# Patient Record
Sex: FEMALE | Race: WHITE | Employment: FULL TIME | URBAN - METROPOLITAN AREA
[De-identification: names, ages, dates, MRNs, and addresses within clinical notes are randomized per-mention and may not be internally consistent; named-entity substitution may affect disease eponyms.]

---

## 2021-08-18 ENCOUNTER — OFFICE VISIT (OUTPATIENT)
Dept: URGENT CARE | Age: 56
End: 2021-08-18
Payer: COMMERCIAL

## 2021-08-18 VITALS — TEMPERATURE: 99.4 F | HEART RATE: 95 BPM | RESPIRATION RATE: 17 BRPM | OXYGEN SATURATION: 96 %

## 2021-08-18 DIAGNOSIS — Z20.822 EXPOSURE TO COVID-19 VIRUS: Primary | ICD-10-CM

## 2021-08-18 LAB — SARS-COV-2 POC: NEGATIVE

## 2021-08-18 PROCEDURE — 87426 SARSCOV CORONAVIRUS AG IA: CPT | Performed by: FAMILY MEDICINE

## 2021-08-18 PROCEDURE — 99202 OFFICE O/P NEW SF 15 MIN: CPT | Performed by: FAMILY MEDICINE

## 2021-08-18 NOTE — PROGRESS NOTES
This patient was seen at [address] [place] [address] Urgent Care while in their vehicle due to COVID-19 pandemic with PPE and focused examination in order to decrease community viral transmission. The patient/guardian gave verbal consent to treat. Direct eposure to Covid  She is vaccinated    The history is provided by the patient. Fatigue  This is a new problem. The current episode started more than 2 days ago. The problem occurs daily. The problem has not changed since onset. Associated symptoms include abdominal pain and headaches. Pertinent negatives include no shortness of breath. Nothing relieves the symptoms. She has tried nothing for the symptoms. Past Medical History:   Diagnosis Date    Adverse effect of anesthesia     startles awake out of anesthesia    Borderline diabetes     Cancer ([facility] [address] 2008    uterine    Chronic pain     right knee pain    Depression     GERD (gastroesophageal reflux disease)     Hypertension     Neuropathy     Right leg    Unspecified sleep apnea     cpap    Urgency of urination         Past Surgical History:   Procedure Laterality Date    HX APPENDECTOMY      HX HYSTERECTOMY      HX ORTHOPAEDIC Right 05/2015    knee arthroscopy         History reviewed. No pertinent family history.      Social History     Socioeconomic History    Marital status:      Spouse name: Not on file    Number of children: Not on file    Years of education: Not on file    Highest education level: Not on file   Occupational History    Not on file   Tobacco Use    Smoking status: Never Smoker    Smokeless tobacco: Never Used   Substance and Sexual Activity    Alcohol use: Yes     Comment: socially    Drug use: No    Sexual activity: Not on file   Other Topics Concern    Not on file   Social History Narrative    Not on file     Social Determinants of Health     Financial Resource Strain:     Difficulty of Paying Living Expenses:    Food Insecurity:     Worried About Running Out of Food in the Last Year:    [address] in the Last Year:    Transportation Needs:     Lack of Transportation (Medical):  Lack of Transportation (Non-Medical):    Physical Activity:     Days of Exercise per Week:     Minutes of Exercise per Session:    Stress:     Feeling of Stress :    Social Connections:     Frequency of Communication with Friends and Family:     Frequency of Social Gatherings with Friends and Family:     Attends Nondenominational Services:     Active Member of Clubs or Organizations:     Attends Club or Organization Meetings:     Marital Status:    Intimate Partner Violence:     Fear of Current or Ex-Partner:     Emotionally Abused:     Physically Abused:     Sexually Abused: ALLERGIES: Patient has no known allergies. Review of Systems   Constitutional: Positive for fatigue. Respiratory: Negative for shortness of breath. Gastrointestinal: Positive for abdominal pain. Neurological: Positive for headaches. All other systems reviewed and are negative. Vitals:    08/18/21 1721   Pulse: 95   Resp: 17   Temp: 99.4 °F (37.4 °C)   SpO2: 96%       Physical Exam  Vitals and nursing note reviewed. Constitutional:       General: She is not in acute distress. Appearance: She is not ill-appearing. Pulmonary:      Effort: Pulmonary effort is normal. No respiratory distress. Breath sounds: No wheezing. MDM    Procedures        ICD-10-CM ICD-9-CM    1. Exposure to COVID-19 virus  Z20.822 V01.79 NOVEL CORONAVIRUS (COVID-19)      AMB POC SARS-COV-2     No orders of the defined types were placed in this encounter. No results found for any visits on 08/18/21. The patients condition was discussed with the patient and they understand. The patient is to follow up with primary care doctor. If signs and symptoms become worse the pt is to go to the ER. The patient is to take medications as prescribed.

## 2021-08-20 LAB
SARS-COV-2, NAA 2 DAY TAT: NORMAL
SARS-COV-2, NAA: NOT DETECTED

## 2021-09-07 ENCOUNTER — OFFICE VISIT (OUTPATIENT)
Dept: URGENT CARE | Age: 56
End: 2021-09-07
Payer: COMMERCIAL

## 2021-09-07 VITALS — HEART RATE: 93 BPM | OXYGEN SATURATION: 96 % | TEMPERATURE: 98.8 F | RESPIRATION RATE: 18 BRPM

## 2021-09-07 DIAGNOSIS — Z20.822 ENCOUNTER FOR LABORATORY TESTING FOR COVID-19 VIRUS: Primary | ICD-10-CM

## 2021-09-07 LAB — SARS-COV-2 POC: NEGATIVE

## 2021-09-07 PROCEDURE — 99213 OFFICE O/P EST LOW 20 MIN: CPT | Performed by: FAMILY MEDICINE

## 2021-09-07 PROCEDURE — 87426 SARSCOV CORONAVIRUS AG IA: CPT | Performed by: FAMILY MEDICINE

## 2021-09-07 RX ORDER — ONDANSETRON 4 MG/1
4 TABLET, ORALLY DISINTEGRATING ORAL
Qty: 20 TABLET | Refills: 0 | Status: SHIPPED | OUTPATIENT
Start: 2021-09-07 | End: 2021-09-07 | Stop reason: SDUPTHER

## 2021-09-07 RX ORDER — ONDANSETRON 4 MG/1
4 TABLET, ORALLY DISINTEGRATING ORAL
Qty: 20 TABLET | Refills: 0 | Status: SHIPPED | OUTPATIENT
Start: 2021-09-07 | End: 2022-03-22

## 2021-09-07 RX ORDER — ONDANSETRON 4 MG/1
4 TABLET, ORALLY DISINTEGRATING ORAL
Qty: 1 TABLET | Refills: 0 | Status: SHIPPED | COMMUNITY
Start: 2021-09-07 | End: 2021-09-07

## 2021-09-07 NOTE — PROGRESS NOTES
Patient presents with a request for COVID Testing. She was exposed to a COVID positive person. She is having fevers and chills, nausea and vomiting, malaise and fatigue. She also has sinus symptoms and headache. Patient has not tried anything for her symptoms as she thought it was related to her blood sugar. Her  is undergoing chemo and she is concerned. This patient was seen in [facility] at [address] [place] [address] Urgent Care while in their vehicle due to COVID-19 pandemic with PPE and focused examination in order to decrease community viral transmission. The patient/guardian gave verbal consent to treat. Past Medical History:   Diagnosis Date    Adverse effect of anesthesia     startles awake out of anesthesia    Borderline diabetes     Cancer ([facility] [address] 2008    uterine    Chronic pain     right knee pain    Depression     GERD (gastroesophageal reflux disease)     Hypertension     Neuropathy     Right leg    Unspecified sleep apnea     cpap    Urgency of urination         Past Surgical History:   Procedure Laterality Date    HX APPENDECTOMY      HX HYSTERECTOMY      HX ORTHOPAEDIC Right 05/2015    knee arthroscopy         History reviewed. No pertinent family history.      Social History     Socioeconomic History    Marital status:      Spouse name: Not on file    Number of children: Not on file    Years of education: Not on file    Highest education level: Not on file   Occupational History    Not on file   Tobacco Use    Smoking status: Never Smoker    Smokeless tobacco: Never Used   Substance and Sexual Activity    Alcohol use: Yes     Comment: socially    Drug use: No    Sexual activity: Not on file   Other Topics Concern    Not on file   Social History Narrative    Not on file     Social Determinants of Health     Financial Resource Strain:     Difficulty of Paying Living Expenses:    Food Insecurity:     Worried About Running Out of Food in the Last Year:     Ran Out of Food in the Last Year:    Transportation Needs:     Lack of Transportation (Medical):  Lack of Transportation (Non-Medical):    Physical Activity:     Days of Exercise per Week:     Minutes of Exercise per Session:    Stress:     Feeling of Stress :    Social Connections:     Frequency of Communication with Friends and Family:     Frequency of Social Gatherings with Friends and Family:     Attends Restorationism Services:     Active Member of Clubs or Organizations:     Attends Club or Organization Meetings:     Marital Status:    Intimate Partner Violence:     Fear of Current or Ex-Partner:     Emotionally Abused:     Physically Abused:     Sexually Abused: ALLERGIES: Patient has no known allergies. Review of Systems   Constitutional: Positive for activity change, appetite change, chills, fatigue and fever. HENT: Positive for congestion, rhinorrhea and sinus pressure. Negative for sinus pain. Respiratory: Negative for cough, chest tightness and shortness of breath. Cardiovascular: Negative for chest pain. Gastrointestinal: Positive for nausea and vomiting. Negative for diarrhea. Vitals:    09/07/21 1806   Pulse: 93   Resp: 18   Temp: 98.8 °F (37.1 °C)   SpO2: 96%       Physical Exam  Constitutional:       General: She is not in acute distress. Appearance: She is well-developed. She is ill-appearing. HENT:      Head: Normocephalic. Right Ear: No drainage. Tympanic membrane is not erythematous or bulging. Left Ear: No drainage. Tympanic membrane is not erythematous or bulging. Nose: Congestion present. No rhinorrhea. Cardiovascular:      Rate and Rhythm: Normal rate. Pulmonary:      Effort: Pulmonary effort is normal. No respiratory distress. Breath sounds: No decreased breath sounds. Neurological:      Mental Status: She is alert. MDM    ICD-10-CM ICD-9-CM    1.  Encounter for laboratory testing for COVID-19 virus  Z20.822 V01.79 NOVEL CORONAVIRUS (COVID-19)      AMB POC SARS-COV-2     Medications Ordered Today   Medications    ondansetron (ZOFRAN ODT) 4 mg disintegrating tablet     Sig: Take 1 Tablet by mouth now for 1 dose. Dispense:  1 Tablet     Refill:  0     Order Specific Question:   Expiration Date     Answer:   8/31/2023     Order Specific Question:   Lot#     Answer:   [id number]     Order Specific Question:        Answer: [employer]    ondansetron (ZOFRAN ODT) 4 mg disintegrating tablet     Sig: Take 1 Tablet by mouth every eight (8) hours as needed for Nausea. Dispense:  20 Tablet     Refill:  0     No results found for any visits on 09/07/21. The patients condition was discussed with the patient and they understand. The patient is to follow up with primary care doctor. If signs and symptoms become worse the pt is to go to the ER. The patient is to take medications as prescribed.      Procedures

## 2021-09-11 LAB — SARS-COV-2, NAA: NOT DETECTED

## 2022-03-22 ENCOUNTER — HOSPITAL ENCOUNTER (EMERGENCY)
Age: 57
Discharge: HOME | End: 2022-03-22
Attending: EMERGENCY MEDICINE
Payer: COMMERCIAL

## 2022-03-22 ENCOUNTER — APPOINTMENT (OUTPATIENT)
Dept: GENERAL RADIOLOGY | Age: 57
End: 2022-03-22
Attending: EMERGENCY MEDICINE
Payer: COMMERCIAL

## 2022-03-22 VITALS
SYSTOLIC BLOOD PRESSURE: 143 MMHG | RESPIRATION RATE: 20 BRPM | HEIGHT: 65 IN | DIASTOLIC BLOOD PRESSURE: 87 MMHG | OXYGEN SATURATION: 95 % | TEMPERATURE: 98.4 F | HEART RATE: 86 BPM | WEIGHT: 239.04 LBS | BODY MASS INDEX: 39.83 KG/M2

## 2022-03-22 DIAGNOSIS — S52.134A CLOSED NONDISPLACED FRACTURE OF NECK OF RIGHT RADIUS, INITIAL ENCOUNTER: Primary | ICD-10-CM

## 2022-03-22 PROCEDURE — 96374 THER/PROPH/DIAG INJ IV PUSH: CPT

## 2022-03-22 PROCEDURE — 73080 X-RAY EXAM OF ELBOW: CPT

## 2022-03-22 PROCEDURE — 74011250636 HC RX REV CODE- 250/636: Performed by: EMERGENCY MEDICINE

## 2022-03-22 PROCEDURE — 99284 EMERGENCY DEPT VISIT MOD MDM: CPT

## 2022-03-22 RX ORDER — FENTANYL CITRATE 50 UG/ML
100 INJECTION, SOLUTION INTRAMUSCULAR; INTRAVENOUS
Status: COMPLETED | OUTPATIENT
Start: 2022-03-22 | End: 2022-03-22

## 2022-03-22 RX ORDER — HYDROCODONE BITARTRATE AND ACETAMINOPHEN 5; 325 MG/1; MG/1
1 TABLET ORAL
Qty: 18 TABLET | Refills: 0 | Status: SHIPPED | OUTPATIENT
Start: 2022-03-22 | End: 2022-03-25

## 2022-03-22 RX ADMIN — FENTANYL CITRATE 100 MCG: 0.05 INJECTION, SOLUTION INTRAMUSCULAR; INTRAVENOUS at 16:36

## 2022-03-22 NOTE — Clinical Note
[address]  [facility] EMERGENCY DEPT  [address] [address]  [phone]    Work/School Note    Date: 3/22/2022    To Whom It May concern:    Suzan Albert was seen and treated today in the emergency room by the following provider(s):  Attending Provider: Glynn Campos MD.      Suzan Albert is excused from work/school on 3/22/2022 through 3/24/2022. She is medically clear to return to work/school on 3/25/2022.          Sincerely,          Sadaf Umaña MD

## 2022-03-22 NOTE — ED NOTES
Art Lopez RN reviewed discharge instructions with the patient. The patient verbalized understanding. All questions and concerns were addressed. The patient is discharged via wheelchair in the care of family members with instructions and prescriptions in hand. Pt is alert and oriented x 4. Respirations are clear and unlabored.

## 2022-03-23 NOTE — ED PROVIDER NOTES
EMERGENCY DEPARTMENT HISTORY AND PHYSICAL EXAM      Date: 3/22/2022  Patient Name: uBrke Frias    History of Presenting Illness     Chief Complaint   Patient presents with    Arm Injury     Pt arrives ambulatory to triage with CC or R arm injury. She reports that she was standing on top of the toilet attempting to fix a blind above the toilet and fell into the bath tub landing on her arm. Denies hitting head or LOC. Patient denies blood thinners. Was seen at urgent care prior to arrival and they believe her elbow is dislocated. History Provided By: Patient    HPI: Burke Frias, 64 y.o. female with PMHx significant for depression, GERD, neuropathy, prior uterine cancer who presents with a chief complaint of right elbow pain after a fall. Patient tells me that she was trying to fix a blind above her toilet and start on the toilet to do so. The lid of the toilet she was standing on moved causing her to fall, landing in the bathtub on her right elbow. She denies hitting her head or loss of consciousness. Is not on anticoagulation. Was seen at an urgent care where they thought she might have a dislocation so sent her to the emergency department for evaluation. Patient is left-hand dominant. PCP: Ayden Calderon MD    There are no other complaints, changes, or physical findings at this time. Current Outpatient Medications   Medication Sig Dispense Refill    HYDROcodone-acetaminophen (Norco) 5-325 mg per tablet Take 1 Tablet by mouth every four (4) hours as needed for Pain for up to 3 days. Max Daily Amount: 6 Tablets. 18 Tablet 0    fluoxetine HCl (PROZAC PO) Take  by mouth.  GABAPENTIN PO Take  by mouth.  liraglutide (VICTOZA 3-SHANTHI SC) by SubCUTAneous route.  GLIMEPIRIDE PO Take  by mouth.  MELOXICAM PO Take  by mouth.  solifenacin (VESICARE) 5 mg tablet Take 5 mg by mouth daily.  atorvastatin (LIPITOR) 40 mg tablet Take 40 mg by mouth daily.       dextroamphetamine-amphetamine (ADDERALL) 20 mg tablet Take 20 mg by mouth three (3) times daily.  carvedilol (COREG) 12.5 mg tablet Take 12.5 mg by mouth two (2) times daily (with meals). Indications: HYPERTENSION      DULoxetine (CYMBALTA) 60 mg capsule Take 60 mg by mouth daily. Indications: ANXIETY WITH DEPRESSION      amLODIPine-benazepril (LOTREL) 5-10 mg per capsule Take 1 Cap by mouth daily. Indications: HYPERTENSION       Past History     Past Medical History:  Past Medical History:   Diagnosis Date    Adverse effect of anesthesia     startles awake out of anesthesia    Borderline diabetes     Cancer ([facility] [address] 2008    uterine    Chronic pain     right knee pain    Depression     GERD (gastroesophageal reflux disease)     Hypertension     Neuropathy     Right leg    Unspecified sleep apnea     cpap    Urgency of urination      Past Surgical History:  Past Surgical History:   Procedure Laterality Date    HX APPENDECTOMY      HX HYSTERECTOMY      HX ORTHOPAEDIC Right 05/2015    knee arthroscopy     Family History:  No family history on file. Social History:  Social History     Tobacco Use    Smoking status: Never Smoker    Smokeless tobacco: Never Used   Substance Use Topics    Alcohol use: Yes     Comment: socially    Drug use: No     Allergies:  No Known Allergies  Review of Systems   Review of Systems   Musculoskeletal: Positive for arthralgias. All other systems reviewed and are negative. Physical Exam   Physical Exam  Vitals and nursing note reviewed. Constitutional:       General: She is not in acute distress. Appearance: She is well-developed. HENT:      Head: Normocephalic and atraumatic. Eyes:      Conjunctiva/sclera: Conjunctivae normal.      Pupils: Pupils are equal, round, and reactive to light. Cardiovascular:      Rate and Rhythm: Normal rate and regular rhythm. Pulmonary:      Effort: Pulmonary effort is normal. No respiratory distress.       Breath sounds: Normal breath sounds. No stridor. Abdominal:      General: There is no distension. Musculoskeletal:      Cervical back: Normal range of motion. Comments: Generalized tenderness to palpation over the right elbow. Patient will not range elbow secondary to pain. No tenderness over the shoulder. Full range of motion at the wrist with 2+ radial pulse   Skin:     General: Skin is warm and dry. Neurological:      Mental Status: She is alert and oriented to person, place, and time. Diagnostic Study Results   Labs -   No results found for this or any previous visit (from the past 12 hour(s)). Radiologic Studies -   XR ELBOW RT MIN 3 V   Final Result      Acute radial neck fracture. XR ELBOW RT MIN 3 V    Result Date: 3/22/2022  Acute radial neck fracture. Medical Decision Making   I am the first provider for this patient. I reviewed the vital signs, available nursing notes, past medical history, past surgical history, family history and social history. Vital Signs-Reviewed the patient's vital signs. No data found. Pulse Oximetry Analysis - 95% on ra      Records Reviewed: Nursing Notes and Old Medical Records    Provider Notes (Medical Decision Making):   Ddx: Fracture, contusion, dislocation. Plan: X-ray    ED Course:   Initial assessment performed. The patients presenting problems have been discussed, and they are in agreement with the care plan formulated and outlined with them. I have encouraged them to ask questions as they arise throughout their visit. X-ray shows a radial neck fracture. Patient placed in a sling. Will discharge with pain medication orthopedic follow-up. Procedures:  Procedures    Critical Care:  none    Disposition:  Discharge Note:  The patient has been re-evaluated and is ready for discharge. Reviewed available results with patient. Counseled patient on diagnosis and care plan.  Patient has expressed understanding, and all questions have been answered. Patient agrees with plan and agrees to follow up as recommended, or to return to the ED if their symptoms worsen. Discharge instructions have been provided and explained to the patient, along with reasons to return to the ED. PLAN:  1. Discharge Medication List as of 3/22/2022  5:29 PM      START taking these medications    Details   HYDROcodone-acetaminophen (Norco) 5-325 mg per tablet Take 1 Tablet by mouth every four (4) hours as needed for Pain for up to 3 days. Max Daily Amount: 6 Tablets., Normal, Disp-18 Tablet, R-0         CONTINUE these medications which have NOT CHANGED    Details   fluoxetine HCl (PROZAC PO) Take  by mouth., Historical Med      GABAPENTIN PO Take  by mouth., Historical Med      liraglutide (VICTOZA 3-SHANTHI SC) by SubCUTAneous route., Historical Med      GLIMEPIRIDE PO Take  by mouth., Historical Med      MELOXICAM PO Take  by mouth., Historical Med      solifenacin (VESICARE) 5 mg tablet Take 5 mg by mouth daily. , Historical Med      atorvastatin (LIPITOR) 40 mg tablet Take 40 mg by mouth daily. , Historical Med      dextroamphetamine-amphetamine (ADDERALL) 20 mg tablet Take 20 mg by mouth three (3) times daily. , Historical Med      carvedilol (COREG) 12.5 mg tablet Take 12.5 mg by mouth two (2) times daily (with meals). Indications: HYPERTENSION, Historical Med      DULoxetine (CYMBALTA) 60 mg capsule Take 60 mg by mouth daily. Indications: ANXIETY WITH DEPRESSION, Historical Med      amLODIPine-benazepril (LOTREL) 5-10 mg per capsule Take 1 Cap by mouth daily. Indications: HYPERTENSION, Historical Med           2.    Follow-up Information     Follow up With Specialties Details Why Contact Info    Wendy Torres MD Orthopedic Surgery Schedule an appointment as soon as possible for a visit   [address] [facility]  [address]  [phone]      [facility] EMERGENCY DEPT Emergency Medicine  As needed, If symptoms worsen [address] [facility] [address]  [phone]        Return to ED if worse     Diagnosis     Clinical Impression:   1. Closed nondisplaced fracture of neck of right radius, initial encounter            Please note that this dictation was completed with [employer], the computer voice recognition software. Quite often unanticipated grammatical, syntax, homophones, and other interpretive errors are inadvertently transcribed by the computer software. Please disregard these errors.   Please excuse any errors that have escaped final proofreading

## 2022-06-12 ENCOUNTER — OFFICE VISIT (OUTPATIENT)
Dept: URGENT CARE | Age: 57
End: 2022-06-12
Payer: COMMERCIAL

## 2022-06-12 VITALS
WEIGHT: 235 LBS | SYSTOLIC BLOOD PRESSURE: 131 MMHG | OXYGEN SATURATION: 98 % | BODY MASS INDEX: 39.15 KG/M2 | DIASTOLIC BLOOD PRESSURE: 82 MMHG | RESPIRATION RATE: 18 BRPM | TEMPERATURE: 98.9 F | HEART RATE: 86 BPM | HEIGHT: 65 IN

## 2022-06-12 DIAGNOSIS — R19.7 DIARRHEA OF PRESUMED INFECTIOUS ORIGIN: Primary | ICD-10-CM

## 2022-06-12 PROCEDURE — 99213 OFFICE O/P EST LOW 20 MIN: CPT | Performed by: NURSE PRACTITIONER

## 2022-06-12 RX ORDER — DICYCLOMINE HYDROCHLORIDE 10 MG/1
10 CAPSULE ORAL
Qty: 21 CAPSULE | Refills: 0 | Status: SHIPPED | OUTPATIENT
Start: 2022-06-12 | End: 2022-09-18

## 2022-06-12 NOTE — PROGRESS NOTES
HISTORY OF PRESENT ILLNESS  Tomas Doyle is a 64 y.o. female. The patient presents with diarrhea multiple times a day since Thursday. This is accompanied by crampy pain which usually precedes the bowel movement but sometimes occurs without. She does not have recent abx use. She states the color is a brown/orange but she has not noticed any blood. She denies foul smell or mucous. She took imodium Friday and yesterday and this slowed the output but didn't get her completely back to normal. She has been able to eat and drink fairly normally and she has increased her fluid intake to stay hydrated. She states she is still making urine. Review of Systems   Constitutional: Negative for chills, fever and malaise/fatigue. Gastrointestinal: Positive for abdominal pain and diarrhea. Negative for blood in stool. Physical Exam  Constitutional:       General: She is not in acute distress. Appearance: Normal appearance. She is well-developed. She is not ill-appearing or diaphoretic. Abdominal:      General: Bowel sounds are normal. There is no distension. Palpations: Abdomen is soft. Tenderness: There is generalized abdominal tenderness. There is no guarding or rebound. Comments: Patient attempted to provide stool sample and was able to give a small sample which was noted to be brown, formed and accompanied by BRB consistent with hemorrhoidal bleeding. Neurological:      Mental Status: She is alert. Psychiatric:         Mood and Affect: Mood is anxious. Behavior: Behavior is cooperative. Comments: Patient appears anxious and uncomfortable due to abdominal cramping. ASSESSMENT and PLAN    ICD-10-CM ICD-9-CM    1.  Diarrhea of presumed infectious origin  R19.7 009.3 ENTERIC BACTERIA PANEL, DNA      OVA & PARASITES, STOOL      OVA & PARASITES, STOOL      ENTERIC BACTERIA PANEL, DNA     Medications Ordered Today   Medications    dicyclomine (BENTYL) 10 mg capsule Sig: Take 1 Capsule by mouth three (3) times daily as needed for Abdominal Cramps. Dispense:  21 Capsule     Refill:  0     No results found for any visits on 06/12/22. The patients condition was discussed with the patient and they understand. The patient is to follow up with primary care doctor. If signs and symptoms become worse the pt is to go to the ER. The patient is to take medications as prescribed. Discussed stool sample with patient and she is wanting to stay here until able to produce another sample. She is going to wait in her car and present back to registration when she is ready.

## 2022-06-12 NOTE — PATIENT INSTRUCTIONS
Diarrhea: Care Instructions  Overview     Diarrhea is loose, watery stools (bowel movements). The exact cause is often hard to find. Sometimes diarrhea is your body's way of getting rid of what caused an upset stomach. Viruses, food poisoning, and many medicines can cause diarrhea. Some people get diarrhea in response to emotional stress, anxiety, or certain foods. Almost everyone has diarrhea now and then. It usually isn't serious, and your stools will return to normal soon. The important thing to do is replace the fluids you have lost, so you can prevent dehydration. The doctor has checked you carefully, but problems can develop later. If you notice any problems or new symptoms, get medical treatment right away. Follow-up care is a key part of your treatment and safety. Be sure to make and go to all appointments, and call your doctor if you are having problems. It's also a good idea to know your test results and keep a list of the medicines you take. How can you care for yourself at home? · Watch for signs of dehydration, which means your body has lost too much water. Dehydration is a serious condition and should be treated right away. Signs of dehydration are:  ? Increasing thirst and dry eyes and mouth. ? Feeling faint or lightheaded. ? A smaller amount of urine than normal.  · To prevent dehydration, drink plenty of fluids. Choose water and other clear liquids until you feel better. If you have kidney, heart, or liver disease and have to limit fluids, talk with your doctor before you increase the amount of fluids you drink. · When you feel like eating, start with small amounts of food. · The doctor may recommend that you take over-the-counter medicine, such as loperamide (Imodium). Read and follow all instructions on the label. Do not use this medicine if you have bloody diarrhea, a high fever, or other signs of serious illness.  Call your doctor if you think you are having a problem with your medicine. When should you call for help? Call 911 anytime you think you may need emergency care. For example, call if:    · You passed out (lost consciousness).     · Your stools are maroon or very bloody. Call your doctor now or seek immediate medical care if:    · You are dizzy or lightheaded, or you feel like you may faint.     · Your stools are black and look like tar, or they have streaks of blood.     · You have new or worse belly pain.     · You have symptoms of dehydration, such as:  ? Dry eyes and a dry mouth. ? Passing only a little urine. ? Cannot keep fluids down.     · You have a new or higher fever. Watch closely for changes in your health, and be sure to contact your doctor if:    · Your diarrhea is getting worse.     · You see pus in the diarrhea.     · You are not getting better after 2 days (48 hours). Where can you learn more? Go to http://www.gray.com/  Enter [id number] in the search box to learn more about \"Diarrhea: Care Instructions. \"  Current as of: July 1, 2021               Content Version: 13.2  © 6394-0002 [employer] Care instructions adapted under license by [employer] (which disclaims liability or warranty for this information). If you have questions about a medical condition or this instruction, always ask your healthcare professional. [address] any warranty or liability for your use of this information.

## 2022-06-13 LAB
CAMPYLOBACTER SPECIES, DNA: NEGATIVE
ENTEROTOXIGEN E COLI, DNA: NEGATIVE
P SHIGELLOIDES DNA STL QL NAA+PROBE: NEGATIVE
SALMONELLA SPECIES, DNA: NEGATIVE
SHIGA TOXIN PRODUCING, DNA: NEGATIVE
SHIGELLA SP+EIEC IPAH STL QL NAA+PROBE: NEGATIVE
VIBRIO SPECIES, DNA: NEGATIVE
Y. ENTEROCOLITICA, DNA: NEGATIVE

## 2022-06-15 ENCOUNTER — OFFICE VISIT (OUTPATIENT)
Dept: URGENT CARE | Age: 57
End: 2022-06-15
Payer: COMMERCIAL

## 2022-06-15 VITALS
HEIGHT: 65 IN | WEIGHT: 234 LBS | RESPIRATION RATE: 17 BRPM | BODY MASS INDEX: 38.99 KG/M2 | OXYGEN SATURATION: 97 % | DIASTOLIC BLOOD PRESSURE: 84 MMHG | TEMPERATURE: 98.4 F | HEART RATE: 81 BPM | SYSTOLIC BLOOD PRESSURE: 124 MMHG

## 2022-06-15 DIAGNOSIS — J02.9 SORE THROAT: Primary | ICD-10-CM

## 2022-06-15 LAB
S PYO AG THROAT QL: NEGATIVE
SARS-COV-2 PCR, POC: NEGATIVE
VALID INTERNAL CONTROL?: YES

## 2022-06-15 PROCEDURE — 99212 OFFICE O/P EST SF 10 MIN: CPT | Performed by: FAMILY MEDICINE

## 2022-06-15 PROCEDURE — 87880 STREP A ASSAY W/OPTIC: CPT | Performed by: FAMILY MEDICINE

## 2022-06-15 PROCEDURE — 87635 SARS-COV-2 COVID-19 AMP PRB: CPT | Performed by: FAMILY MEDICINE

## 2022-06-15 NOTE — PROGRESS NOTES
Sore Throat   This is a new problem. The current episode started 2 days ago. The problem has not changed since onset. There has been no fever. Associated symptoms include congestion, headaches, trouble swallowing and cough. Pertinent negatives include no plugged ear sensation, no shortness of breath and no swollen glands. She has tried nothing for the symptoms. Past Medical History:   Diagnosis Date    Adverse effect of anesthesia     startles awake out of anesthesia    Borderline diabetes     Cancer ([facility] [address] 2008    uterine    Chronic pain     right knee pain    Depression     GERD (gastroesophageal reflux disease)     Hypertension     Neuropathy     Right leg    Unspecified sleep apnea     cpap    Urgency of urination         Past Surgical History:   Procedure Laterality Date    HX APPENDECTOMY      HX HYSTERECTOMY      HX ORTHOPAEDIC Right 05/2015    knee arthroscopy         History reviewed. No pertinent family history. Social History     Socioeconomic History    Marital status:      Spouse name: Not on file    Number of children: Not on file    Years of education: Not on file    Highest education level: Not on file   Occupational History    Not on file   Tobacco Use    Smoking status: Never Smoker    Smokeless tobacco: Never Used   Substance and Sexual Activity    Alcohol use: Yes     Comment: socially    Drug use: No    Sexual activity: Not on file   Other Topics Concern    Not on file   Social History Narrative    Not on file     Social Determinants of Health     Financial Resource Strain:     Difficulty of Paying Living Expenses: Not on file   Food Insecurity:     Worried About Running Out of Food in the Last Year: Not on file    Sierra of Food in the Last Year: Not on file   Transportation Needs:     Lack of Transportation (Medical): Not on file    Lack of Transportation (Non-Medical):  Not on file   Physical Activity:     Days of Exercise per Week: Not on file  Minutes of Exercise per Session: Not on file   Stress:     Feeling of Stress : Not on file   Social Connections:     Frequency of Communication with Friends and Family: Not on file    Frequency of Social Gatherings with Friends and Family: Not on file    Attends [employer] Services: Not on file    Active Member of Clubs or Organizations: Not on file    Attends Club or Organization Meetings: Not on file    Marital Status: Not on file   Intimate Partner Violence:     Fear of Current or Ex-Partner: Not on file    Emotionally Abused: Not on file    Physically Abused: Not on file    Sexually Abused: Not on file   Housing Stability:     Unable to Pay for Housing in the Last Year: Not on file    Number of [place] in the Last Year: Not on file    Unstable Housing in the Last Year: Not on file                ALLERGIES: Patient has no known allergies. Review of Systems   HENT: Positive for congestion, sore throat and trouble swallowing. Respiratory: Positive for cough. Negative for shortness of breath. Neurological: Positive for headaches. All other systems reviewed and are negative. Vitals:    06/15/22 1834   BP: 124/84   Pulse: 81   Resp: 17   Temp: 98.4 °F (36.9 °C)   SpO2: 97%   Weight: 234 lb (106.1 kg)   Height: 5' 5\" (1.651 m)       Physical Exam  Vitals and nursing note reviewed. Constitutional:       General: She is not in acute distress. Appearance: She is not ill-appearing. HENT:      Nose: No congestion or rhinorrhea. Mouth/Throat:      Pharynx: No oropharyngeal exudate or posterior oropharyngeal erythema. Pulmonary:      Effort: Pulmonary effort is normal. No respiratory distress. Breath sounds: No wheezing or rales. MDM    Procedures        ICD-10-CM ICD-9-CM    1. Sore throat  J02.9 462 POCT COVID-19, SARS-COV-2, PCR      AMB POC RAPID STREP A     No orders of the defined types were placed in this encounter.     Results for orders placed or performed in visit on 06/15/22   AMB POC RAPID STREP A   Result Value Ref Range    VALID INTERNAL CONTROL POC Yes     Group A Strep Ag Negative Negative     The patients condition was discussed with the patient and they understand. The patient is to follow up with primary care doctor. If signs and symptoms become worse the pt is to go to the ER. The patient is to take medications as prescribed.

## 2022-06-17 LAB
O+P SPEC MICRO: NORMAL
O+P STL CONC: NORMAL
SPECIMEN SOURCE: NORMAL

## 2022-06-17 NOTE — PROGRESS NOTES
Plea inform that ova and parasite from stool studies are normal. Follow up with PCP for any continued symptoms.

## 2022-07-30 ENCOUNTER — OFFICE VISIT (OUTPATIENT)
Dept: URGENT CARE | Age: 57
End: 2022-07-30
Payer: COMMERCIAL

## 2022-07-30 VITALS
HEART RATE: 84 BPM | OXYGEN SATURATION: 97 % | SYSTOLIC BLOOD PRESSURE: 108 MMHG | RESPIRATION RATE: 16 BRPM | BODY MASS INDEX: 38.94 KG/M2 | TEMPERATURE: 99 F | HEIGHT: 65 IN | DIASTOLIC BLOOD PRESSURE: 70 MMHG

## 2022-07-30 DIAGNOSIS — Z20.822 CLOSE EXPOSURE TO COVID-19 VIRUS: Primary | ICD-10-CM

## 2022-07-30 LAB — SARS-COV-2 PCR, POC: NEGATIVE

## 2022-07-30 PROCEDURE — 87635 SARS-COV-2 COVID-19 AMP PRB: CPT | Performed by: INTERNAL MEDICINE

## 2022-07-30 PROCEDURE — 99212 OFFICE O/P EST SF 10 MIN: CPT | Performed by: INTERNAL MEDICINE

## 2022-07-30 NOTE — PATIENT INSTRUCTIONS
Follow Up with PCP in 3-5 days if no improvement/routine care. Call to be seen sooner or return Here/ER, if no improvement with treatments advised/prescribed or symptoms/pain worsen (develop fever, pain worsens significantly, or develop NEW symptoms). Rest, vitamin C, Zinc, and plenty of fluids are my primary suggestions. You may also try alternating Tylenol and Motrin (if not allergic) every 3 hours. This may help your symptoms too. Use of any OTC meds for your symptoms is appropriate. Lastly, if you develop any shortness of breath, chest pain, or extreme fatigue; those are concerning for pneumonia and you should report to an urgent care with xray or ER. Otherwise, stay home and self-isolate for 5-10 days, pending symtoms and vaccination status.

## 2022-07-30 NOTE — PROGRESS NOTES
HISTORY OF PRESENT ILLNESS  Shira Hernandez is a 64 y.o. female. Pt presents today concerned for COVID infection for past 4-7 days. + known exposure to confirmed POSITIVE person BEFORE her symptoms started on Wednesday. 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 Having congestion, mild sore throat, and HA. Denies fever, cough, CP, SOB, loss of sense of taste and smell, myalgias, & N/V/D/Abd pain. Vaccination status: FULLY VACC & BOOSTED. There is no problem list on file for this patient. There are no problems to display for this patient. Current Outpatient Medications   Medication Sig Dispense Refill    fluoxetine HCl (PROZAC PO) Take  by mouth. GABAPENTIN PO Take  by mouth.      liraglutide (VICTOZA 3-SHANTHI SC) by SubCUTAneous route. GLIMEPIRIDE PO Take  by mouth. MELOXICAM PO Take  by mouth.      solifenacin (VESICARE) 5 mg tablet Take 5 mg by mouth daily. atorvastatin (LIPITOR) 40 mg tablet Take 40 mg by mouth daily. dextroamphetamine-amphetamine (ADDERALL) 20 mg tablet Take 20 mg by mouth three (3) times daily. carvedilol (COREG) 12.5 mg tablet Take 12.5 mg by mouth two (2) times daily (with meals). Indications: HYPERTENSION      DULoxetine (CYMBALTA) 60 mg capsule Take 60 mg by mouth daily. Indications: ANXIETY WITH DEPRESSION      amLODIPine-benazepril (LOTREL) 5-10 mg per capsule Take 1 Cap by mouth daily. Indications: HYPERTENSION      dicyclomine (BENTYL) 10 mg capsule Take 1 Capsule by mouth three (3) times daily as needed for Abdominal Cramps.  (Patient not taking: Reported on 7/30/2022) 21 Capsule 0     No Known Allergies  Past Medical History:   Diagnosis Date    Adverse effect of anesthesia     startles awake out of anesthesia    Borderline diabetes     Cancer ([facility] [address] 2008    uterine    Chronic pain     right knee pain    Depression     GERD (gastroesophageal reflux disease)     Hypertension     Neuropathy Right leg    Unspecified sleep apnea     cpap    Urgency of urination      Past Surgical History:   Procedure Laterality Date    HX APPENDECTOMY      HX HYSTERECTOMY      HX ORTHOPAEDIC Right 05/2015    knee arthroscopy     History reviewed. No pertinent family history. Social History     Tobacco Use    Smoking status: Never    Smokeless tobacco: Never   Substance Use Topics    Alcohol use: Yes     Comment: socially      Review of Systems   Constitutional:  Negative for chills, fever and malaise/fatigue. HENT:  Positive for congestion and sore throat. Respiratory:  Negative for cough and shortness of breath. Cardiovascular:  Negative for chest pain. Gastrointestinal:  Negative for diarrhea, nausea and vomiting. Musculoskeletal:  Negative for myalgias. Neurological:  Positive for headaches. All other systems reviewed and are negative. Physical Exam  Vitals and nursing note reviewed. Constitutional:       General: She is not in acute distress. Appearance: She is well-developed. She is not diaphoretic. HENT:      Head: Normocephalic and atraumatic. Right Ear: External ear normal. Tympanic membrane is injected. Left Ear: External ear normal. A middle ear effusion is present. Tympanic membrane is injected. Nose:      Right Turbinates: Not swollen. Left Turbinates: Not swollen. Right Sinus: No maxillary sinus tenderness or frontal sinus tenderness. Left Sinus: No maxillary sinus tenderness or frontal sinus tenderness. Mouth/Throat:      Pharynx: No posterior oropharyngeal erythema. Tonsils: No tonsillar exudate. 0 on the right. 0 on the left. Cardiovascular:      Rate and Rhythm: Normal rate. Pulmonary:      Effort: Pulmonary effort is normal. No respiratory distress. Breath sounds: Normal breath sounds. No decreased breath sounds, wheezing or rhonchi. Abdominal:      General: There is no distension.    Lymphadenopathy:      Cervical: Cervical adenopathy present. Right cervical: Superficial cervical adenopathy present. Left cervical: Superficial cervical adenopathy present. Neurological:      Mental Status: She is alert and oriented to person, place, and time. Psychiatric:         Behavior: Behavior normal.         Judgment: Judgment normal.       ASSESSMENT and PLAN  CLINICAL IMPRESSION:     ICD-10-CM ICD-9-CM    1. Close exposure to COVID-19 virus  Z20.822 V01.79 POCT COVID-19, SARS-COV-2, PCR            Plan:  F/U with PCP/ER, INI or symptoms worsen. May report to ER for SOB or CP. Advised to self-isolate for 5-10 days following potential exposure/symptom onset and at least 24 hours following fever. Symptomatic treatment advised otherwise with use of OTC/Rx meds. Results for orders placed or performed in visit on 06/15/22   POCT COVID-19, SARS-COV-2, PCR   Result Value Ref Range    SARS-COV-2 PCR, POC Negative Negative   AMB POC RAPID STREP A   Result Value Ref Range    VALID INTERNAL CONTROL POC Yes     Group A Strep Ag Negative Negative             The patients condition was discussed with the patient and they understand. The patient is to follow up with PCP. If signs and symptoms become worse the pt is to go to the ER. The patient is to take medications as prescribed.

## 2022-09-18 ENCOUNTER — OFFICE VISIT (OUTPATIENT)
Dept: URGENT CARE | Age: 57
End: 2022-09-18
Payer: COMMERCIAL

## 2022-09-18 VITALS
TEMPERATURE: 98.4 F | SYSTOLIC BLOOD PRESSURE: 120 MMHG | OXYGEN SATURATION: 97 % | HEART RATE: 89 BPM | WEIGHT: 233 LBS | BODY MASS INDEX: 38.82 KG/M2 | RESPIRATION RATE: 16 BRPM | DIASTOLIC BLOOD PRESSURE: 79 MMHG | HEIGHT: 65 IN

## 2022-09-18 DIAGNOSIS — S90.32XA CONTUSION OF LEFT FOOT, INITIAL ENCOUNTER: ICD-10-CM

## 2022-09-18 DIAGNOSIS — T07.XXXA MULTIPLE FRACTURES: Primary | ICD-10-CM

## 2022-09-18 PROCEDURE — 99213 OFFICE O/P EST LOW 20 MIN: CPT | Performed by: FAMILY MEDICINE

## 2022-09-18 NOTE — PROGRESS NOTES
Carroll Jones is a 62 y.o. female who presents with left foot pain and swelling since 80lb heat press fell on left foot yesterday. Reports painful at the toes, but not great toe. The history is provided by the patient. Past Medical History:   Diagnosis Date    Adverse effect of anesthesia     startles awake out of anesthesia    Borderline diabetes     Cancer ([facility] [address] 2008    uterine    Chronic pain     right knee pain    Depression     GERD (gastroesophageal reflux disease)     Hypertension     Neuropathy     Right leg    Unspecified sleep apnea     cpap    Urgency of urination         Past Surgical History:   Procedure Laterality Date    HX APPENDECTOMY      HX HYSTERECTOMY      HX ORTHOPAEDIC Right 05/2015    knee arthroscopy         History reviewed. No pertinent family history. Social History     Socioeconomic History    Marital status:      Spouse name: Not on file    Number of children: Not on file    Years of education: Not on file    Highest education level: Not on file   Occupational History    Not on file   Tobacco Use    Smoking status: Never    Smokeless tobacco: Never   Substance and Sexual Activity    Alcohol use: Yes     Comment: socially    Drug use: No    Sexual activity: Not on file   Other Topics Concern    Not on file   Social History Narrative    Not on file     Social Determinants of Health     Financial Resource Strain: Not on file   Food Insecurity: Not on file   Transportation Needs: Not on file   Physical Activity: Not on file   Stress: Not on file   Social Connections: Not on file   Intimate Partner Violence: Not on file   Housing Stability: Not on file                ALLERGIES: Patient has no known allergies. Review of Systems   Musculoskeletal:  Positive for joint swelling. Skin:  Positive for color change.      Vitals:    09/18/22 1731 09/18/22 1735   BP:  120/79   Pulse:  89   Resp:  16   Temp:  98.4 °F (36.9 °C)   SpO2:  97%   Weight: 233 lb (105.7 kg) Height: 5' 5\" (1.651 m)        Physical Exam  Vitals and nursing note reviewed. Constitutional:       General: She is not in acute distress. Appearance: She is well-developed. She is not diaphoretic. Pulmonary:      Effort: Pulmonary effort is normal.   Musculoskeletal:      Left foot: Decreased range of motion (2nd to 5th toes). Normal capillary refill. Swelling (and bruising at 2nd to 5th toes), tenderness and bony tenderness (2nd to 5th toes) present. Normal pulse. Neurological:      Mental Status: She is alert. Psychiatric:         Behavior: Behavior normal.         Thought Content: Thought content normal.         Judgment: Judgment normal.       MDM    ICD-10-CM ICD-9-CM   1. Multiple fractures  T07. XXXA 829.0   2. Contusion of left foot, initial encounter  S90.32XA 924.20       Orders Placed This Encounter    CAST BOOT OR SHOE    XR FOOT LT MIN 3 V     Standing Status:   Future     Number of Occurrences:   1     Standing Expiration Date:   10/18/2023     Order Specific Question:   Reason for Exam     Answer:   80lb heat press fell on distal foot yesterday; TTP at 2nd to 5th digits      Pepe tape  Cast shoe  Elevate  Ice  Rest   The patient is to follow up with Ortho. If signs and symptoms become worse the pt is to go to the ER. XR Results (most recent):  Results from Appointment encounter on 09/18/22    XR FOOT LT MIN 3 V    Narrative  EXAM: XR FOOT LT MIN 3 V    INDICATION: 80lb heat press fell on distal foot yesterday; TTP at 2nd to 5th  digits. COMPARISON: None. FINDINGS: Three views of the left foot demonstrate  -nondisplaced acute oblique fracture proximal phalanx fifth digit;  -nondisplaced comminuted intra-articular fracture head of proximal fourth digit  phalanx; and  -minimally displaced distal shaft fracture proximal third digit. The soft tissues are within normal limits.     Impression  Acute 3rd-5th proximal phalanges fracture including intra-articular  fracture head of proximal phalanx of fourth digit.         Procedures

## 2023-01-10 ENCOUNTER — OFFICE VISIT (OUTPATIENT)
Dept: URGENT CARE | Age: 58
End: 2023-01-10
Payer: COMMERCIAL

## 2023-01-10 VITALS
OXYGEN SATURATION: 97 % | RESPIRATION RATE: 16 BRPM | DIASTOLIC BLOOD PRESSURE: 79 MMHG | TEMPERATURE: 97.7 F | SYSTOLIC BLOOD PRESSURE: 141 MMHG | HEART RATE: 81 BPM

## 2023-01-10 DIAGNOSIS — H60.392 OTHER INFECTIVE ACUTE OTITIS EXTERNA OF LEFT EAR: Primary | ICD-10-CM

## 2023-01-10 PROCEDURE — 99213 OFFICE O/P EST LOW 20 MIN: CPT | Performed by: FAMILY MEDICINE

## 2023-01-10 RX ORDER — CIPROFLOXACIN AND DEXAMETHASONE 3; 1 MG/ML; MG/ML
4 SUSPENSION/ DROPS AURICULAR (OTIC) 2 TIMES DAILY
Qty: 7.5 ML | Refills: 0 | Status: SHIPPED | OUTPATIENT
Start: 2023-01-10 | End: 2023-01-17

## 2023-01-10 NOTE — PROGRESS NOTES
Delora Nissen is a 62 y.o. female who presents with itchy left ear x 5 days, but OTC ear drops and now with worsening ear pain since yesterday. Denies cough, fever, ST, nasal congestion. The history is provided by the patient. Past Medical History:   Diagnosis Date    Adverse effect of anesthesia     startles awake out of anesthesia    Borderline diabetes     Cancer ([facility] [address] 2008    uterine    Chronic pain     right knee pain    Depression     GERD (gastroesophageal reflux disease)     Hypertension     Neuropathy     Right leg    Unspecified sleep apnea     cpap    Urgency of urination         Past Surgical History:   Procedure Laterality Date    HX APPENDECTOMY      HX HYSTERECTOMY      HX ORTHOPAEDIC Right 05/2015    knee arthroscopy         History reviewed. No pertinent family history. Social History     Socioeconomic History    Marital status:      Spouse name: Not on file    Number of children: Not on file    Years of education: Not on file    Highest education level: Not on file   Occupational History    Not on file   Tobacco Use    Smoking status: Never    Smokeless tobacco: Never   Substance and Sexual Activity    Alcohol use: Yes     Comment: socially    Drug use: No    Sexual activity: Not on file   Other Topics Concern    Not on file   Social History Narrative    Not on file     Social Determinants of Health     Financial Resource Strain: Not on file   Food Insecurity: Not on file   Transportation Needs: Not on file   Physical Activity: Not on file   Stress: Not on file   Social Connections: Not on file   Intimate Partner Violence: Not on file   Housing Stability: Not on file                ALLERGIES: Patient has no known allergies. Review of Systems   HENT:  Positive for ear pain. Negative for congestion and sore throat. Respiratory:  Negative for cough.       Vitals:    01/10/23 0900   BP: (!) 141/79   Pulse: 81   Resp: 16   Temp: 97.7 °F (36.5 °C)   SpO2: 97% Physical Exam  Vitals and nursing note reviewed. Constitutional:       General: She is not in acute distress. Appearance: She is well-developed. She is not diaphoretic. HENT:      Right Ear: Tympanic membrane, ear canal and external ear normal. There is no impacted cerumen. Left Ear: Swelling (canal: swollen and erythematous; ear wick placed) and tenderness (canal) present. Pulmonary:      Effort: Pulmonary effort is normal.   Neurological:      Mental Status: She is alert. Psychiatric:         Behavior: Behavior normal.         Thought Content: Thought content normal.         Judgment: Judgment normal.       [facility]    ICD-10-CM ICD-9-CM   1. Other infective acute otitis externa of left ear  H60.392 380.10       Orders Placed This Encounter    ciprofloxacin-dexamethasone (CIPRODEX) 0.3-0.1 % otic suspension     Sig: Administer 4 Drops in left ear two (2) times a day for 7 days. Dispense:  7.5 mL     Refill:  0      Return to clinic in 2 days if ear wick still in ear canal    If signs and symptoms become worse the pt is to go to the ER.           Procedures

## 2023-05-19 ENCOUNTER — OFFICE VISIT (OUTPATIENT)
Age: 58
End: 2023-05-19

## 2023-05-19 VITALS
RESPIRATION RATE: 16 BRPM | HEART RATE: 74 BPM | SYSTOLIC BLOOD PRESSURE: 118 MMHG | BODY MASS INDEX: 39.97 KG/M2 | WEIGHT: 240.2 LBS | OXYGEN SATURATION: 98 % | TEMPERATURE: 98.6 F | DIASTOLIC BLOOD PRESSURE: 88 MMHG

## 2023-05-19 DIAGNOSIS — L02.416 ABSCESS OF LEFT LEG: Primary | ICD-10-CM

## 2023-05-19 DIAGNOSIS — L40.9 PSORIASIS: ICD-10-CM

## 2023-05-19 RX ORDER — LORAZEPAM 0.5 MG/1
TABLET ORAL
COMMUNITY
Start: 2023-05-01

## 2023-05-19 RX ORDER — SULFAMETHOXAZOLE AND TRIMETHOPRIM 800; 160 MG/1; MG/1
1 TABLET ORAL 2 TIMES DAILY
Qty: 24 TABLET | Refills: 0 | Status: SHIPPED | OUTPATIENT
Start: 2023-05-19 | End: 2023-05-31

## 2023-05-19 RX ORDER — GABAPENTIN 300 MG/1
CAPSULE ORAL
COMMUNITY
Start: 2023-03-27

## 2023-05-19 RX ORDER — FLUOXETINE HYDROCHLORIDE 40 MG/1
80 CAPSULE ORAL DAILY
COMMUNITY
Start: 2023-03-28

## 2023-05-19 RX ORDER — SEMAGLUTIDE 1.34 MG/ML
INJECTION, SOLUTION SUBCUTANEOUS
COMMUNITY
Start: 2023-02-11

## 2023-05-19 RX ORDER — TRIAMCINOLONE ACETONIDE 1 MG/G
CREAM TOPICAL
Qty: 60 G | Refills: 1 | Status: SHIPPED | OUTPATIENT
Start: 2023-05-19

## 2023-05-19 RX ORDER — ATORVASTATIN CALCIUM 20 MG/1
20 TABLET, FILM COATED ORAL DAILY
COMMUNITY
Start: 2023-02-24

## 2023-05-19 ASSESSMENT — ENCOUNTER SYMPTOMS
EYES NEGATIVE: 1
ABDOMINAL PAIN: 0
SORE THROAT: 0
COUGH: 0
GASTROINTESTINAL NEGATIVE: 1
VOMITING: 0
SHORTNESS OF BREATH: 0
RESPIRATORY NEGATIVE: 1
RHINORRHEA: 0

## 2023-05-19 NOTE — PROGRESS NOTES
Filemon Paredes ( 1965) is a 62 y.o. female, Established Patient patient, here for evaluation of the following chief complaint(s):  Abscess (Patient has an abscess on her lower leg leg. Has been there for 1.5 weeks has gotten larger and more painful)     Requests refill of triamcinolone. ASSESSMENT/PLAN:  Jamison Luna was seen today for abscess. Diagnoses and all orders for this visit:    Abscess of left leg  -     sulfamethoxazole-trimethoprim (BACTRIM DS;SEPTRA DS) 800-160 MG per tablet; Take 1 tablet by mouth 2 times daily for 12 days  -     mupirocin (BACTROBAN) 2 % ointment; Apply topically 3 times daily. Psoriasis  -     triamcinolone (KENALOG) 0.1 % cream; Apply topically 2 times daily. Return in about 1 week (around 5/26/2023), or if symptoms worsen or fail to improve, for Abscess. History provided by:  Patient   used: No    Abscess  Severity:  Moderate  Onset quality:  Gradual  Duration:  1 week  Timing:  Intermittent  Progression:  Worsening  Chronicity:  New  Associated symptoms: rash    Associated symptoms: no abdominal pain, no chest pain, no congestion, no cough, no fatigue, no fever, no headaches, no loss of consciousness, no myalgias, no rhinorrhea, no shortness of breath, no sore throat and no vomiting      Review of Systems   Constitutional: Negative. Negative for fatigue and fever. HENT: Negative. Negative for congestion, rhinorrhea and sore throat. Eyes: Negative. Respiratory: Negative. Negative for cough and shortness of breath. Cardiovascular: Negative. Negative for chest pain. Gastrointestinal: Negative. Negative for abdominal pain and vomiting. Genitourinary: Negative. Musculoskeletal:  Negative for myalgias. Skin:  Positive for rash. Neurological:  Negative for loss of consciousness and headaches.        Subjective:   Pt is a 62 y.o. female     Past Medical History:   Diagnosis Date    Adverse effect of anesthesia

## 2024-10-22 ENCOUNTER — OFFICE VISIT (OUTPATIENT)
Age: 59
End: 2024-10-22

## 2024-10-22 VITALS
SYSTOLIC BLOOD PRESSURE: 127 MMHG | OXYGEN SATURATION: 97 % | WEIGHT: 238 LBS | TEMPERATURE: 98.5 F | HEART RATE: 92 BPM | BODY MASS INDEX: 39.61 KG/M2 | RESPIRATION RATE: 20 BRPM | DIASTOLIC BLOOD PRESSURE: 82 MMHG

## 2024-10-22 DIAGNOSIS — J40 BRONCHITIS: ICD-10-CM

## 2024-10-22 DIAGNOSIS — J01.90 ACUTE BACTERIAL SINUSITIS: Primary | ICD-10-CM

## 2024-10-22 DIAGNOSIS — B96.89 ACUTE BACTERIAL SINUSITIS: Primary | ICD-10-CM

## 2024-10-22 RX ORDER — PANTOPRAZOLE SODIUM 40 MG/1
40 TABLET, DELAYED RELEASE ORAL DAILY
COMMUNITY
Start: 2024-10-10

## 2024-10-22 RX ORDER — LISDEXAMFETAMINE DIMESYLATE 30 MG/1
CAPSULE ORAL
COMMUNITY
Start: 2024-09-29

## 2024-10-22 RX ORDER — PREDNISONE 5 MG/1
TABLET ORAL
Qty: 1 EACH | Refills: 0 | Status: SHIPPED | OUTPATIENT
Start: 2024-10-22

## 2024-10-22 RX ORDER — LAMOTRIGINE 25 MG/1
TABLET ORAL
COMMUNITY
Start: 2024-10-21

## 2024-10-22 RX ORDER — BENZONATATE 200 MG/1
200 CAPSULE ORAL 3 TIMES DAILY PRN
Qty: 21 CAPSULE | Refills: 0 | Status: SHIPPED | OUTPATIENT
Start: 2024-10-22 | End: 2024-10-29

## 2024-10-22 NOTE — PATIENT INSTRUCTIONS
Symptoms consistent with acute bacterial sinusitis with some viral bronchitis  Augmentin as ordered, 2x per day for 10 days  Her vital signs are stable, lung sounds are clear  Prednisone dosepack as directed  Mucinex-DM OTC to help thin secretions  Saline sinus rinses, hot tea with honey, throat lozenges  Lots of fluids, plenty of rest  Return if symptoms persist or worsen  ED with any severe shortness of breath, chest pain, or if unable to tolerate food or fluids

## 2024-10-22 NOTE — PROGRESS NOTES
Erin Pearson (:  1965) is a 59 y.o. female,Established patient, here for evaluation of the following chief complaint(s):  Cough (Cough, sinus congestion with dark yellow/brown mucus, x1 week)      Assessment & Plan :  Visit Diagnoses and Associated Orders       Acute bacterial sinusitis    -  Primary    amoxicillin-clavulanate (AUGMENTIN) 875-125 MG per tablet [[id number]]      predniSONE 5 MG (21) TBPK [981282]           Bronchitis        predniSONE 5 MG (21) TBPK [155750]      benzonatate (TESSALON) 200 MG capsule [[id number]]           ORDERS WITHOUT AN ASSOCIATED DIAGNOSIS    pantoprazole (PROTONIX) 40 MG tablet [21892]      VYVANSE 30 MG capsule [[id number]]      lamoTRIgine (LAMICTAL) 25 MG tablet [[id number]]            Symptoms consistent with acute bacterial sinusitis with some viral bronchitis  Augmentin as ordered, 2x per day for 10 days  Her vital signs are stable, lung sounds are clear  Prednisone dosepack as directed  Mucinex-DM OTC to help thin secretions  Saline sinus rinses, hot tea with honey, throat lozenges  Lots of fluids, plenty of rest  Return if symptoms persist or worsen  ED with any severe shortness of breath, chest pain, or if unable to tolerate food or fluids       Subjective :  HPI     59 y.o. female presents with symptoms of 10 days of gradually worsening cough, nasal congestion, thick green mucus from her sinuses.  She denies any fevers, chills, body aches.  She does report some general feelings of fatigue and malaise.  Denies ear pain, denies sore throat.  She does report significant sinus pressure and congestion.  Coughing is productive of some clear-colored sputum and is becoming progressively worse.  She denies any significant wheezing or shortness of breath.  Cough aggravated by speaking.  No chest or abdominal pain, no nausea, vomiting or diarrhea.         Vitals:    10/22/24 1728   BP: 127/82   Site: Right Upper Arm   Position: Sitting   Cuff Size: Large Adult   Pulse: 92   Resp: 20

## 2025-04-24 ENCOUNTER — APPOINTMENT (OUTPATIENT)
Facility: HOSPITAL | Age: 60
End: 2025-04-24
Payer: COMMERCIAL

## 2025-04-24 ENCOUNTER — HOSPITAL ENCOUNTER (EMERGENCY)
Facility: HOSPITAL | Age: 60
Discharge: HOME | End: 2025-04-24
Payer: COMMERCIAL

## 2025-04-24 ENCOUNTER — OFFICE VISIT (OUTPATIENT)
Age: 60
End: 2025-04-24

## 2025-04-24 VITALS
SYSTOLIC BLOOD PRESSURE: 168 MMHG | OXYGEN SATURATION: 96 % | TEMPERATURE: 98.6 F | DIASTOLIC BLOOD PRESSURE: 87 MMHG | HEIGHT: 66 IN | WEIGHT: 225 LBS | RESPIRATION RATE: 13 BRPM | HEART RATE: 76 BPM | BODY MASS INDEX: 36.16 KG/M2

## 2025-04-24 DIAGNOSIS — I10 ESSENTIAL HYPERTENSION: ICD-10-CM

## 2025-04-24 DIAGNOSIS — R52 PAIN: Primary | ICD-10-CM

## 2025-04-24 DIAGNOSIS — N30.00 ACUTE CYSTITIS WITHOUT HEMATURIA: ICD-10-CM

## 2025-04-24 DIAGNOSIS — M54.9 ACUTE BILATERAL BACK PAIN, UNSPECIFIED BACK LOCATION: ICD-10-CM

## 2025-04-24 DIAGNOSIS — K57.30 DIVERTICULOSIS OF COLON WITHOUT DIVERTICULITIS: ICD-10-CM

## 2025-04-24 DIAGNOSIS — M54.50 ACUTE BILATERAL LOW BACK PAIN WITHOUT SCIATICA: Primary | ICD-10-CM

## 2025-04-24 DIAGNOSIS — R10.9 ABDOMINAL PAIN, UNSPECIFIED ABDOMINAL LOCATION: ICD-10-CM

## 2025-04-24 DIAGNOSIS — R79.89 ELEVATED PLATELET COUNT: ICD-10-CM

## 2025-04-24 LAB
ALBUMIN SERPL-MCNC: 3.7 G/DL (ref 3.5–5)
ALBUMIN/GLOB SERPL: 0.8 (ref 1.1–2.2)
ALP SERPL-CCNC: 139 U/L (ref 45–117)
ALT SERPL-CCNC: 24 U/L (ref 12–78)
ANION GAP SERPL CALC-SCNC: 9 MMOL/L (ref 2–12)
APPEARANCE UR: ABNORMAL
AST SERPL-CCNC: 19 U/L (ref 15–37)
BACTERIA URNS QL MICRO: ABNORMAL /HPF
BASOPHILS # BLD: 0.13 K/UL (ref 0–0.1)
BASOPHILS NFR BLD: 1.3 % (ref 0–1)
BILIRUB SERPL-MCNC: 0.3 MG/DL (ref 0.2–1)
BILIRUB UR QL: NEGATIVE
BUN SERPL-MCNC: 18 MG/DL (ref 6–20)
BUN/CREAT SERPL: 19 (ref 12–20)
CALCIUM SERPL-MCNC: 9 MG/DL (ref 8.5–10.1)
CAOX CRY URNS QL MICRO: ABNORMAL
CHLORIDE SERPL-SCNC: 103 MMOL/L (ref 97–108)
CO2 SERPL-SCNC: 24 MMOL/L (ref 21–32)
COLOR UR: ABNORMAL
CREAT SERPL-MCNC: 0.96 MG/DL (ref 0.55–1.02)
DIFFERENTIAL METHOD BLD: ABNORMAL
EOSINOPHIL # BLD: 0.19 K/UL (ref 0–0.4)
EOSINOPHIL NFR BLD: 2 % (ref 0–7)
EPITH CASTS URNS QL MICRO: ABNORMAL /LPF
ERYTHROCYTE [DISTWIDTH] IN BLOOD BY AUTOMATED COUNT: 14.4 % (ref 11.5–14.5)
GLOBULIN SER CALC-MCNC: 4.5 G/DL (ref 2–4)
GLUCOSE SERPL-MCNC: 106 MG/DL (ref 65–100)
GLUCOSE UR STRIP.AUTO-MCNC: NEGATIVE MG/DL
HCT VFR BLD AUTO: 38.9 % (ref 35–47)
HGB BLD-MCNC: 12.6 G/DL (ref 11.5–16)
HGB UR QL STRIP: NEGATIVE
IMM GRANULOCYTES # BLD AUTO: 0.04 K/UL (ref 0–0.04)
IMM GRANULOCYTES NFR BLD AUTO: 0.4 % (ref 0–0.5)
KETONES UR QL STRIP.AUTO: ABNORMAL MG/DL
LEUKOCYTE ESTERASE UR QL STRIP.AUTO: ABNORMAL
LIPASE SERPL-CCNC: 25 U/L (ref 13–75)
LYMPHOCYTES # BLD: 3.6 K/UL (ref 0.8–3.5)
LYMPHOCYTES NFR BLD: 37 % (ref 12–49)
MCH RBC QN AUTO: 26.8 PG (ref 26–34)
MCHC RBC AUTO-ENTMCNC: 32.4 G/DL (ref 30–36.5)
MCV RBC AUTO: 82.8 FL (ref 80–99)
MONOCYTES # BLD: 1.01 K/UL (ref 0–1)
MONOCYTES NFR BLD: 10.4 % (ref 5–13)
NEUTS SEG # BLD: 4.76 K/UL (ref 1.8–8)
NEUTS SEG NFR BLD: 48.9 % (ref 32–75)
NITRITE UR QL STRIP.AUTO: NEGATIVE
NRBC # BLD: 0 K/UL (ref 0–0.01)
NRBC BLD-RTO: 0 PER 100 WBC
PH UR STRIP: 6 (ref 5–8)
PLATELET # BLD AUTO: 539 K/UL (ref 150–400)
PMV BLD AUTO: 8.6 FL (ref 8.9–12.9)
POTASSIUM SERPL-SCNC: 3.4 MMOL/L (ref 3.5–5.1)
PROT SERPL-MCNC: 8.2 G/DL (ref 6.4–8.2)
PROT UR STRIP-MCNC: 30 MG/DL
RBC # BLD AUTO: 4.7 M/UL (ref 3.8–5.2)
RBC #/AREA URNS HPF: ABNORMAL /HPF (ref 0–5)
SODIUM SERPL-SCNC: 136 MMOL/L (ref 136–145)
SP GR UR REFRACTOMETRY: >1.03 (ref 1–1.03)
URINE CULTURE IF INDICATED: ABNORMAL
UROBILINOGEN UR QL STRIP.AUTO: 1 EU/DL (ref 0.2–1)
WBC # BLD AUTO: 9.7 K/UL (ref 3.6–11)
WBC URNS QL MICRO: ABNORMAL /HPF (ref 0–4)

## 2025-04-24 PROCEDURE — 96374 THER/PROPH/DIAG INJ IV PUSH: CPT

## 2025-04-24 PROCEDURE — 85025 COMPLETE CBC W/AUTO DIFF WBC: CPT

## 2025-04-24 PROCEDURE — 99285 EMERGENCY DEPT VISIT HI MDM: CPT

## 2025-04-24 PROCEDURE — 83690 ASSAY OF LIPASE: CPT

## 2025-04-24 PROCEDURE — 87086 URINE CULTURE/COLONY COUNT: CPT

## 2025-04-24 PROCEDURE — 87186 SC STD MICRODIL/AGAR DIL: CPT

## 2025-04-24 PROCEDURE — 74177 CT ABD & PELVIS W/CONTRAST: CPT

## 2025-04-24 PROCEDURE — 6360000002 HC RX W HCPCS: Performed by: PHYSICIAN ASSISTANT

## 2025-04-24 PROCEDURE — 36415 COLL VENOUS BLD VENIPUNCTURE: CPT

## 2025-04-24 PROCEDURE — 80053 COMPREHEN METABOLIC PANEL: CPT

## 2025-04-24 PROCEDURE — 2500000003 HC RX 250 WO HCPCS: Performed by: PHYSICIAN ASSISTANT

## 2025-04-24 PROCEDURE — 87088 URINE BACTERIA CULTURE: CPT

## 2025-04-24 PROCEDURE — 6360000004 HC RX CONTRAST MEDICATION: Performed by: PHYSICIAN ASSISTANT

## 2025-04-24 PROCEDURE — 81001 URINALYSIS AUTO W/SCOPE: CPT

## 2025-04-24 RX ORDER — CEFDINIR 300 MG/1
300 CAPSULE ORAL 2 TIMES DAILY
Qty: 14 CAPSULE | Refills: 0 | Status: SHIPPED | OUTPATIENT
Start: 2025-04-24 | End: 2025-05-01

## 2025-04-24 RX ORDER — KETOROLAC TROMETHAMINE 30 MG/ML
60 INJECTION, SOLUTION INTRAMUSCULAR; INTRAVENOUS ONCE
Status: COMPLETED | OUTPATIENT
Start: 2025-04-24 | End: 2025-04-24

## 2025-04-24 RX ORDER — IOPAMIDOL 755 MG/ML
100 INJECTION, SOLUTION INTRAVASCULAR
Status: COMPLETED | OUTPATIENT
Start: 2025-04-24 | End: 2025-04-24

## 2025-04-24 RX ORDER — HYDROCODONE BITARTRATE AND ACETAMINOPHEN 5; 325 MG/1; MG/1
1 TABLET ORAL EVERY 8 HOURS PRN
Qty: 9 TABLET | Refills: 0 | Status: SHIPPED | OUTPATIENT
Start: 2025-04-24 | End: 2025-04-27

## 2025-04-24 RX ORDER — CYCLOBENZAPRINE HCL 10 MG
10 TABLET ORAL 3 TIMES DAILY PRN
Qty: 21 TABLET | Refills: 0 | Status: SHIPPED | OUTPATIENT
Start: 2025-04-24 | End: 2025-05-04

## 2025-04-24 RX ORDER — IBUPROFEN 600 MG/1
600 TABLET, FILM COATED ORAL EVERY 8 HOURS PRN
Qty: 20 TABLET | Refills: 0 | Status: SHIPPED | OUTPATIENT
Start: 2025-04-24

## 2025-04-24 RX ADMIN — IOPAMIDOL 100 ML: 755 INJECTION, SOLUTION INTRAVENOUS at 15:42

## 2025-04-24 RX ADMIN — WATER 1000 MG: 1 INJECTION INTRAMUSCULAR; INTRAVENOUS; SUBCUTANEOUS at 16:34

## 2025-04-24 RX ADMIN — KETOROLAC TROMETHAMINE 60 MG: 30 INJECTION, SOLUTION INTRAMUSCULAR; INTRAVENOUS at 13:45

## 2025-04-24 ASSESSMENT — VISUAL ACUITY: OU: 1

## 2025-04-24 ASSESSMENT — PAIN SCALES - GENERAL: PAINLEVEL_OUTOF10: 0

## 2025-04-24 NOTE — PROGRESS NOTES
Erin Pearson (:  1965) is a 59 y.o. female,Established patient, here for evaluation of the following chief complaint(s):  No chief complaint on file.      Assessment & Plan :  Visit Diagnoses and Associated Orders         Pain    -  Primary    ketorolac (TORADOL) injection 60 mg [[id number]]             Acute bilateral back pain, unspecified back location               Abdominal pain, unspecified abdominal location                     Patient is rocking back and forth in extreme pain that comes and goes in waves.  She is tearful and shaking.  It is difficult to get a history or do a thorough exam on her.  I do wonder if she has kidney stones as the pain seems to come in waves and is severe.  We gave her 60 mg IM of ketorolac and called the ambulance to take her to the hospital.  Given the amount of pain and the fact that it is in her back and abdomen, I am concerned about more acute and urgent/emergent etiologies as well.  Patient verbalized understanding of this and need for better pain control as well and is okay with going with the paramedics to the [facility] emergency department at this time      Subjective :  HPI     59 y.o. female presents with bilateral low back pain that wraps around to her abdomen that is severe.  She started having some aching 3 days ago, however today all of a sudden her pain is severe and unrelenting.  The pain initially had started on the left low back and wraparound but now it is on both sides.  She has never had pain like this before.         There were no vitals filed for this visit.    No results found for this visit on 25.      Objective   Physical Exam  Constitutional:       General: She is in acute distress.      Appearance: She is diaphoretic.   HENT:      Head: Normocephalic.      Mouth/Throat:      Mouth: Mucous membranes are moist.   Pulmonary:      Effort: Pulmonary effort is normal.   Skin:     General: Skin is warm.      Findings: No rash.

## 2025-04-24 NOTE — ED PROVIDER NOTES
[facility] EMERGENCY DEPARTMENT  EMERGENCY DEPARTMENT ENCOUNTER       Pt Name: Erin Pearson  MRN: [id number]  Birthdate 1965  Date of evaluation: 4/24/2025  Provider: ADAL Costa   PCP: Dara Meyers APRN - NP  Note Started: 2:16 PM EDT 4/24/25     CHIEF COMPLAINT       Chief Complaint   Patient presents with    Flank Pain     [facility] for cc of lower back pain radiating to left/right flank. Pt coming from [facility] urgent care for kidney stone r/o. Denies n/v, at this time, took ODT zofran this AM with relief. Pt received 30mg IM Toradol with relief. Denies urinary sxs.         HISTORY OF PRESENT ILLNESS: 1 or more elements      History From: Patient  HPI Limitations: None     Erin Pearson is a 59 y.o. female who presents via EMS with severe low back pain that radiates to her flanks bilaterally.  Patient tells me on Saturday she helped her sister move some furniture and she thought she may have \"strained\" her back.  She tells me she did have occasional low back pain starting on Sunday for the next few days.  She tells me today her pain seemed to worsen and is worse with standing.  She tells me she did go to the urgent care and noticed that her pain was severe when exiting her vehicle.  She was seen at an urgent care and ultimately referred here given the severity of her pain.  She denies any chest pain or shortness of breath.  She has no history of spinal surgery.  Past surgical history is significant for hysterectomy and appendectomy.  She takes no blood thinners.  She denies any radiating symptoms but for the flank.  She denies any numbness or weakness.  She denies any chest pain or shortness of breath.  She tells me she did receive IM Toradol by EMS and did have good relief of her symptoms.  At the time of my exam, she is not in any pain.     Nursing Notes were all reviewed and agreed with or any disagreements were addressed in the HPI.    2025   1527 Contaminated urine sample is significant for pyuria and 4+ bacteriuria and a urine culture is ordered.  1 g IV ceftriaxone is ordered. [EJ]   1528 CMP reflects normal renal function without transaminitis.  Potassium is only very slightly low at 3.4. [EJ]   1528 CBC is without leukocytosis or anemia.  Platelets are elevated at 539. [EJ]   1710 CT of the abdomen pelvis with IV contrast shows no kidney stones.  There is diverticulosis without diverticulitis.  On reexamination, patient does seem comfortable.  She did receive IV ceftriaxone.  Vital signs have remained stable during her ER visit. [EJ]   1711 Believe safe for discharge and outpatient management for acute cystitis without hematuria and acute bilateral low back pain. [EJ]      ED Course User Index  [EJ] Brent Cuevas, PA       Disposition Considerations (Tests not done, Shared Decision Making, Pt Expectation of Test or Tx.):      FINAL IMPRESSION     1. Acute bilateral low back pain without sciatica    2. Acute cystitis without hematuria    3. Diverticulosis of colon without diverticulitis    4. Essential hypertension    5. Elevated platelet count          DISPOSITION/PLAN   DISPOSITION Decision To Discharge 04/24/2025 05:11:36 PM   DISPOSITION CONDITION Stable         Discharge Note: The patient is stable for discharge home. The signs, symptoms, diagnosis, and discharge instructions have been discussed, understanding conveyed, and agreed upon. The patient is to follow up as recommended or return to ER should their symptoms worsen.      PATIENT REFERRED TO:  Dara Meyers APRN - NP  [address]  [address]  [facility] [address] [phone]    Call   PRIMARY CARE: as needed regarding your ER visit       DISCHARGE MEDICATIONS:     Medication List        START taking these medications      cefdinir 300 MG capsule  Commonly known as: OMNICEF  Take 1 capsule by mouth 2 times daily for 7 days     cyclobenzaprine 10 MG tablet  Commonly

## 2025-04-24 NOTE — DISCHARGE INSTRUCTIONS
Thank You!    It was a pleasure taking care of you in our Emergency Department today. We know that when you come to our Emergency Department, you are entrusting us with your health, comfort, and safety. Our physicians and nurses honor that trust, and truly appreciate the opportunity to care for you and your loved ones.      We also value your feedback. If you receive a survey about your Emergency Department experience today, please fill it out.  We care about our patients' feedback, and we listen to what you have to say.  Thank you.    Brent Cuevas PA-C      ________________________________________________________________________  I have included a copy of your lab results and/or radiologic studies from today's visit so you can have them easily available at your follow-up visit. We hope you feel better and please do not hesitate to contact the ED if you have any questions at all!    Recent Results (from the past 12 hours)   CBC with Auto Differential    Collection Time: 04/24/25  2:18 PM   Result Value Ref Range    WBC 9.7 3.6 - 11.0 K/uL    RBC 4.70 3.80 - 5.20 M/uL    Hemoglobin 12.6 11.5 - 16.0 g/dL    Hematocrit 38.9 35.0 - 47.0 %    MCV 82.8 80.0 - 99.0 FL    MCH 26.8 26.0 - 34.0 PG    MCHC 32.4 30.0 - 36.5 g/dL    RDW 14.4 11.5 - 14.5 %    Platelets 539 (H) 150 - 400 K/uL    MPV 8.6 (L) 8.9 - 12.9 FL    Nucleated RBCs 0.0 0  WBC    nRBC 0.00 0.00 - 0.01 K/uL    Neutrophils % 48.9 32.0 - 75.0 %    Lymphocytes % 37.0 12.0 - 49.0 %    Monocytes % 10.4 5.0 - 13.0 %    Eosinophils % 2.0 0.0 - 7.0 %    Basophils % 1.3 (H) 0.0 - 1.0 %    Immature Granulocytes % 0.4 0.0 - 0.5 %    Neutrophils Absolute 4.76 1.80 - 8.00 K/UL    Lymphocytes Absolute 3.60 (H) 0.80 - 3.50 K/UL    Monocytes Absolute 1.01 (H) 0.00 - 1.00 K/UL    Eosinophils Absolute 0.19 0.00 - 0.40 K/UL    Basophils Absolute 0.13 (H) 0.00 - 0.10 K/UL    Immature Granulocytes Absolute 0.04 0.00 - 0.04 K/UL    Differential Type AUTOMATED      Comprehensive Metabolic Panel    Collection Time: 04/24/25  2:18 PM   Result Value Ref Range    Sodium 136 136 - 145 mmol/L    Potassium 3.4 (L) 3.5 - 5.1 mmol/L    Chloride 103 97 - 108 mmol/L    CO2 24 21 - 32 mmol/L    Anion Gap 9 2 - 12 mmol/L    Glucose 106 (H) 65 - 100 mg/dL    BUN 18 6 - 20 MG/DL    Creatinine 0.96 0.55 - 1.02 MG/DL    BUN/Creatinine Ratio 19 12 - 20      Est, Glom Filt Rate 68 >60 ml/min/1.73m2    Calcium 9.0 8.5 - 10.1 MG/DL    Total Bilirubin 0.3 0.2 - 1.0 MG/DL    ALT 24 12 - 78 U/L    AST 19 15 - 37 U/L    Alk Phosphatase 139 (H) 45 - 117 U/L    Total Protein 8.2 6.4 - 8.2 g/dL    Albumin 3.7 3.5 - 5.0 g/dL    Globulin 4.5 (H) 2.0 - 4.0 g/dL    Albumin/Globulin Ratio 0.8 (L) 1.1 - 2.2     Lipase    Collection Time: 04/24/25  2:18 PM   Result Value Ref Range    Lipase 25 13 - 75 U/L   Urinalysis with Reflex to Culture    Collection Time: 04/24/25  2:28 PM    Specimen: Urine   Result Value Ref Range    Color, UA DARK YELLOW      Appearance TURBID (A) CLEAR      Specific Gravity, UA >1.030 (H) 1.003 - 1.030    pH, Urine 6.0 5.0 - 8.0      Protein, UA 30 (A) NEG mg/dL    Glucose, Ur Negative NEG mg/dL    Ketones, Urine TRACE (A) NEG mg/dL    Bilirubin, Urine Negative NEG      Blood, Urine Negative NEG      Urobilinogen, Urine 1.0 0.2 - 1.0 EU/dL    Nitrite, Urine Negative NEG      Leukocyte Esterase, Urine MODERATE (A) NEG      WBC, UA  0 - 4 /hpf    RBC, UA 0-5 0 - 5 /hpf    Epithelial Cells, UA MODERATE (A) FEW /lpf    BACTERIA, URINE 4+ (A) NEG /hpf    Urine Culture if Indicated URINE CULTURE ORDERED (A) CNI      Calcium Oxalate 2+ (A) NEG       CT ABDOMEN PELVIS W IV CONTRAST Additional Contrast? None   Final Result      1. Colonic diverticulosis without evidence of acute diverticulitis.   2. Bibasilar subsegmental atelectasis.         Electronically signed by Erin Centeno        [unfilled]  The exam and treatment you received in the Emergency Department were for an

## 2025-04-26 ENCOUNTER — RESULTS FOLLOW-UP (OUTPATIENT)
Facility: HOSPITAL | Age: 60
End: 2025-04-26

## 2025-04-26 LAB
BACTERIA SPEC CULT: ABNORMAL
CC UR VC: ABNORMAL
SERVICE CMNT-IMP: ABNORMAL

## 2025-08-11 ENCOUNTER — HOSPITAL ENCOUNTER (OUTPATIENT)
Facility: HOSPITAL | Age: 60
Discharge: HOME | End: 2025-08-14
Payer: COMMERCIAL

## 2025-08-11 DIAGNOSIS — M17.12 ARTHRITIS OF LEFT KNEE: ICD-10-CM

## 2025-08-11 PROCEDURE — 73700 CT LOWER EXTREMITY W/O DYE: CPT

## 2025-08-29 ENCOUNTER — OFFICE VISIT (OUTPATIENT)
Age: 60
End: 2025-08-29

## 2025-08-29 VITALS
BODY MASS INDEX: 35.83 KG/M2 | TEMPERATURE: 98.6 F | OXYGEN SATURATION: 99 % | WEIGHT: 222 LBS | SYSTOLIC BLOOD PRESSURE: 128 MMHG | RESPIRATION RATE: 18 BRPM | HEART RATE: 78 BPM | DIASTOLIC BLOOD PRESSURE: 80 MMHG

## 2025-08-29 DIAGNOSIS — M79.602 LEFT ARM PAIN: ICD-10-CM

## 2025-08-29 DIAGNOSIS — M79.642 LEFT HAND PAIN: ICD-10-CM
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
## 2025-08-29 ASSESSMENT — ENCOUNTER SYMPTOMS
CHEST TIGHTNESS: 0
RHINORRHEA: 0
SHORTNESS OF BREATH: 0
COUGH: 0
SORE THROAT: 0